# Patient Record
Sex: FEMALE | Race: WHITE | HISPANIC OR LATINO
[De-identification: names, ages, dates, MRNs, and addresses within clinical notes are randomized per-mention and may not be internally consistent; named-entity substitution may affect disease eponyms.]

---

## 2018-08-28 ENCOUNTER — RESULT REVIEW (OUTPATIENT)
Age: 43
End: 2018-08-28

## 2019-01-05 ENCOUNTER — EMERGENCY (EMERGENCY)
Facility: HOSPITAL | Age: 44
LOS: 1 days | Discharge: HOME | End: 2019-01-05
Attending: STUDENT IN AN ORGANIZED HEALTH CARE EDUCATION/TRAINING PROGRAM
Payer: MEDICAID

## 2019-01-05 VITALS
DIASTOLIC BLOOD PRESSURE: 89 MMHG | HEART RATE: 112 BPM | OXYGEN SATURATION: 97 % | SYSTOLIC BLOOD PRESSURE: 115 MMHG | RESPIRATION RATE: 17 BRPM | TEMPERATURE: 98 F

## 2019-01-05 LAB
ALBUMIN SERPL ELPH-MCNC: 4.2 G/DL — SIGNIFICANT CHANGE UP (ref 3.5–5)
ALP SERPL-CCNC: 97 U/L — SIGNIFICANT CHANGE UP (ref 40–120)
ALT FLD-CCNC: 48 U/L DA — SIGNIFICANT CHANGE UP (ref 10–60)
ANION GAP SERPL CALC-SCNC: 11 MMOL/L — SIGNIFICANT CHANGE UP (ref 5–17)
AST SERPL-CCNC: 22 U/L — SIGNIFICANT CHANGE UP (ref 10–40)
BILIRUB SERPL-MCNC: 0.7 MG/DL — SIGNIFICANT CHANGE UP (ref 0.2–1.2)
BUN SERPL-MCNC: 18 MG/DL — SIGNIFICANT CHANGE UP (ref 7–18)
CALCIUM SERPL-MCNC: 9.9 MG/DL — SIGNIFICANT CHANGE UP (ref 8.4–10.5)
CHLORIDE SERPL-SCNC: 104 MMOL/L — SIGNIFICANT CHANGE UP (ref 96–108)
CO2 SERPL-SCNC: 24 MMOL/L — SIGNIFICANT CHANGE UP (ref 22–31)
CREAT SERPL-MCNC: 0.85 MG/DL — SIGNIFICANT CHANGE UP (ref 0.5–1.3)
D DIMER BLD IA.RAPID-MCNC: 196 NG/ML DDU — SIGNIFICANT CHANGE UP
GLUCOSE SERPL-MCNC: 102 MG/DL — HIGH (ref 70–99)
HCG SERPL-ACNC: <1 MIU/ML — SIGNIFICANT CHANGE UP
HCT VFR BLD CALC: 43.7 % — SIGNIFICANT CHANGE UP (ref 34.5–45)
HGB BLD-MCNC: 14.2 G/DL — SIGNIFICANT CHANGE UP (ref 11.5–15.5)
LYMPHOCYTES # BLD AUTO: 16 % — SIGNIFICANT CHANGE UP (ref 13–44)
MCHC RBC-ENTMCNC: 29.3 PG — SIGNIFICANT CHANGE UP (ref 27–34)
MCHC RBC-ENTMCNC: 32.4 GM/DL — SIGNIFICANT CHANGE UP (ref 32–36)
MCV RBC AUTO: 90.6 FL — SIGNIFICANT CHANGE UP (ref 80–100)
MONOCYTES NFR BLD AUTO: 6 % — SIGNIFICANT CHANGE UP (ref 2–14)
NEUTROPHILS NFR BLD AUTO: 78 % — HIGH (ref 43–77)
PLATELET # BLD AUTO: 419 K/UL — HIGH (ref 150–400)
POTASSIUM SERPL-MCNC: 4.1 MMOL/L — SIGNIFICANT CHANGE UP (ref 3.5–5.3)
POTASSIUM SERPL-SCNC: 4.1 MMOL/L — SIGNIFICANT CHANGE UP (ref 3.5–5.3)
PROT SERPL-MCNC: 9.9 G/DL — HIGH (ref 6–8.3)
RBC # BLD: 4.82 M/UL — SIGNIFICANT CHANGE UP (ref 3.8–5.2)
RBC # FLD: 11.6 % — SIGNIFICANT CHANGE UP (ref 10.3–14.5)
SODIUM SERPL-SCNC: 139 MMOL/L — SIGNIFICANT CHANGE UP (ref 135–145)
TROPONIN I SERPL-MCNC: <0.015 NG/ML — SIGNIFICANT CHANGE UP (ref 0–0.04)
WBC # BLD: 18.7 K/UL — HIGH (ref 3.8–10.5)
WBC # FLD AUTO: 18.7 K/UL — HIGH (ref 3.8–10.5)

## 2019-01-05 PROCEDURE — 71046 X-RAY EXAM CHEST 2 VIEWS: CPT | Mod: 26

## 2019-01-05 PROCEDURE — 99285 EMERGENCY DEPT VISIT HI MDM: CPT

## 2019-01-05 RX ORDER — MORPHINE SULFATE 50 MG/1
4 CAPSULE, EXTENDED RELEASE ORAL ONCE
Qty: 0 | Refills: 0 | Status: DISCONTINUED | OUTPATIENT
Start: 2019-01-05 | End: 2019-01-05

## 2019-01-05 RX ORDER — KETOROLAC TROMETHAMINE 30 MG/ML
30 SYRINGE (ML) INJECTION ONCE
Qty: 0 | Refills: 0 | Status: DISCONTINUED | OUTPATIENT
Start: 2019-01-05 | End: 2019-01-05

## 2019-01-05 RX ORDER — SODIUM CHLORIDE 9 MG/ML
1000 INJECTION INTRAMUSCULAR; INTRAVENOUS; SUBCUTANEOUS ONCE
Qty: 0 | Refills: 0 | Status: COMPLETED | OUTPATIENT
Start: 2019-01-05 | End: 2019-01-05

## 2019-01-05 RX ORDER — FAMOTIDINE 10 MG/ML
20 INJECTION INTRAVENOUS ONCE
Qty: 0 | Refills: 0 | Status: COMPLETED | OUTPATIENT
Start: 2019-01-05 | End: 2019-01-05

## 2019-01-05 RX ADMIN — Medication 30 MILLIGRAM(S): at 23:58

## 2019-01-05 RX ADMIN — Medication 30 MILLILITER(S): at 20:36

## 2019-01-05 RX ADMIN — SODIUM CHLORIDE 1000 MILLILITER(S): 9 INJECTION INTRAMUSCULAR; INTRAVENOUS; SUBCUTANEOUS at 22:07

## 2019-01-05 RX ADMIN — Medication 30 MILLIGRAM(S): at 22:07

## 2019-01-05 RX ADMIN — SODIUM CHLORIDE 1000 MILLILITER(S): 9 INJECTION INTRAMUSCULAR; INTRAVENOUS; SUBCUTANEOUS at 20:33

## 2019-01-05 RX ADMIN — FAMOTIDINE 20 MILLIGRAM(S): 10 INJECTION INTRAVENOUS at 20:37

## 2019-01-05 NOTE — ED PROVIDER NOTE - PHYSICAL EXAMINATION
no midline c, t or l spine ttp.  mild discomfort to palpation of thoracic paraspinal in the region of the scapula

## 2019-01-05 NOTE — ED PROVIDER NOTE - OBJECTIVE STATEMENT
42 y/o female with no significant PMHx presents to the ED c/o back pain x 2 weeks. Pt notes she has also been experiencing acid reflux Sx x 3 days. Pt denies fever, chills, chest pain, shortness of breath, nausea, vomiting, focal weakness/numbness, or any other complaints. Pt also denies recent trauma, fall or IV drug use. NKDA. 44 y/o female with no significant PMHx presents to the ED c/o back pain x 1 weeks. Pt notes she has also been experiencing acid reflux Sx x 3 days. Pt denies fever, chills, chest pain, shortness of breath, nausea, vomiting, focal weakness/numbness, or any other complaints. Pt also denies recent trauma, fall or IV drug use. NKDA.

## 2019-01-05 NOTE — ED PROVIDER NOTE - CHPI ED SYMPTOMS NEG
no fever/no chills, no chest pain, no shortness of breath, no nausea, no vomiting, no focal weakness/numbness

## 2019-01-05 NOTE — ED ADULT NURSE NOTE - NSIMPLEMENTINTERV_GEN_ALL_ED
Implemented All Universal Safety Interventions:  [place] to call system. Call bell, personal items and telephone within reach. Instruct patient to call for assistance. Room bathroom lighting operational. Non-slip footwear when patient is off stretcher. Physically safe environment: no spills, clutter or unnecessary equipment. Stretcher in lowest position, wheels locked, appropriate side rails in place.

## 2019-01-05 NOTE — ED PROVIDER NOTE - MEDICAL DECISION MAKING DETAILS
patient presenting with back pain. otherwise well appearing. neurovascularly intact. lung clear. concern for msk pain vs pna vs ptx vs acs. will obtain ekg, lab, trop, cxr. no midline back pain, neurovascularly intact. no signs of caude, no ivdu, immunocompromise. will treat pain and reassess

## 2019-01-05 NOTE — ED PROVIDER NOTE - PROGRESS NOTE DETAILS
patient ct shows concern for esophageal dysmotility. patient denies abd pain. endorses having bms. patient tolerate small amount of po fluid. hr improved. wbc and ketone likely in the setting of mild dehydration. patient offered admission for work up, patient endorses she rather follow up outpatient gi. patient endorses she want pain med, med prescribed. patient instructed to eat smaller meal, giving time for transit. instructed to return for new or worsening symptoms including pain, inability to tolerate po. patient info placed in f.u book to facilitate gi f.u and upper gi series

## 2019-01-05 NOTE — ED ADULT TRIAGE NOTE - CHIEF COMPLAINT QUOTE
C/o cold x 2 months and pain to right shoulder blade and bad acid reflux for the past 4 days. Unable to eat x 1 week.

## 2019-01-06 VITALS
SYSTOLIC BLOOD PRESSURE: 122 MMHG | RESPIRATION RATE: 16 BRPM | DIASTOLIC BLOOD PRESSURE: 83 MMHG | OXYGEN SATURATION: 97 % | TEMPERATURE: 98 F | HEART RATE: 88 BPM

## 2019-01-06 LAB
APPEARANCE UR: CLEAR — SIGNIFICANT CHANGE UP
BILIRUB UR-MCNC: ABNORMAL
COLOR SPEC: YELLOW — SIGNIFICANT CHANGE UP
DIFF PNL FLD: ABNORMAL
GLUCOSE UR QL: NEGATIVE — SIGNIFICANT CHANGE UP
KETONES UR-MCNC: ABNORMAL
LEUKOCYTE ESTERASE UR-ACNC: ABNORMAL
NITRITE UR-MCNC: NEGATIVE — SIGNIFICANT CHANGE UP
PH UR: 5 — SIGNIFICANT CHANGE UP (ref 5–8)
PROT UR-MCNC: 30 MG/DL
SP GR SPEC: 1.02 — SIGNIFICANT CHANGE UP (ref 1.01–1.02)
UROBILINOGEN FLD QL: 4

## 2019-01-06 PROCEDURE — 81001 URINALYSIS AUTO W/SCOPE: CPT

## 2019-01-06 PROCEDURE — 96361 HYDRATE IV INFUSION ADD-ON: CPT | Mod: XU

## 2019-01-06 PROCEDURE — 71275 CT ANGIOGRAPHY CHEST: CPT | Mod: 26

## 2019-01-06 PROCEDURE — 84702 CHORIONIC GONADOTROPIN TEST: CPT

## 2019-01-06 PROCEDURE — 87086 URINE CULTURE/COLONY COUNT: CPT

## 2019-01-06 PROCEDURE — 96374 THER/PROPH/DIAG INJ IV PUSH: CPT | Mod: XU

## 2019-01-06 PROCEDURE — 85027 COMPLETE CBC AUTOMATED: CPT

## 2019-01-06 PROCEDURE — 85379 FIBRIN DEGRADATION QUANT: CPT

## 2019-01-06 PROCEDURE — 84484 ASSAY OF TROPONIN QUANT: CPT

## 2019-01-06 PROCEDURE — 36415 COLL VENOUS BLD VENIPUNCTURE: CPT

## 2019-01-06 PROCEDURE — 96375 TX/PRO/DX INJ NEW DRUG ADDON: CPT | Mod: XU

## 2019-01-06 PROCEDURE — 80053 COMPREHEN METABOLIC PANEL: CPT

## 2019-01-06 PROCEDURE — 71046 X-RAY EXAM CHEST 2 VIEWS: CPT

## 2019-01-06 PROCEDURE — 93005 ELECTROCARDIOGRAM TRACING: CPT

## 2019-01-06 PROCEDURE — 71275 CT ANGIOGRAPHY CHEST: CPT

## 2019-01-06 PROCEDURE — 99284 EMERGENCY DEPT VISIT MOD MDM: CPT | Mod: 25

## 2019-01-06 RX ORDER — LIDOCAINE 4 G/100G
30 CREAM TOPICAL ONCE
Qty: 0 | Refills: 0 | Status: COMPLETED | OUTPATIENT
Start: 2019-01-06 | End: 2019-01-06

## 2019-01-06 RX ORDER — FAMOTIDINE 10 MG/ML
20 INJECTION INTRAVENOUS ONCE
Qty: 0 | Refills: 0 | Status: COMPLETED | OUTPATIENT
Start: 2019-01-06 | End: 2019-01-06

## 2019-01-06 RX ADMIN — FAMOTIDINE 20 MILLIGRAM(S): 10 INJECTION INTRAVENOUS at 00:28

## 2019-01-06 RX ADMIN — MORPHINE SULFATE 4 MILLIGRAM(S): 50 CAPSULE, EXTENDED RELEASE ORAL at 00:28

## 2019-01-06 RX ADMIN — LIDOCAINE 15 MILLILITER(S): 4 CREAM TOPICAL at 00:28

## 2019-01-07 LAB
CULTURE RESULTS: SIGNIFICANT CHANGE UP
SPECIMEN SOURCE: SIGNIFICANT CHANGE UP

## 2019-05-31 ENCOUNTER — EMERGENCY (EMERGENCY)
Facility: HOSPITAL | Age: 44
LOS: 1 days | Discharge: HOME | End: 2019-05-31
Attending: EMERGENCY MEDICINE
Payer: MEDICAID

## 2019-05-31 VITALS
RESPIRATION RATE: 16 BRPM | DIASTOLIC BLOOD PRESSURE: 83 MMHG | OXYGEN SATURATION: 99 % | WEIGHT: 139.99 LBS | TEMPERATURE: 98 F | HEART RATE: 95 BPM | HEIGHT: 64 IN | SYSTOLIC BLOOD PRESSURE: 125 MMHG

## 2019-05-31 PROCEDURE — 99283 EMERGENCY DEPT VISIT LOW MDM: CPT | Mod: 25

## 2019-06-01 LAB
ALBUMIN SERPL ELPH-MCNC: 3.5 G/DL — SIGNIFICANT CHANGE UP (ref 3.5–5)
ALP SERPL-CCNC: 89 U/L — SIGNIFICANT CHANGE UP (ref 40–120)
ALT FLD-CCNC: 14 U/L DA — SIGNIFICANT CHANGE UP (ref 10–60)
ANION GAP SERPL CALC-SCNC: 8 MMOL/L — SIGNIFICANT CHANGE UP (ref 5–17)
AST SERPL-CCNC: 11 U/L — SIGNIFICANT CHANGE UP (ref 10–40)
BASOPHILS # BLD AUTO: 0.05 K/UL — SIGNIFICANT CHANGE UP (ref 0–0.2)
BASOPHILS NFR BLD AUTO: 0.4 % — SIGNIFICANT CHANGE UP (ref 0–2)
BILIRUB SERPL-MCNC: 0.7 MG/DL — SIGNIFICANT CHANGE UP (ref 0.2–1.2)
BUN SERPL-MCNC: 4 MG/DL — LOW (ref 7–18)
CALCIUM SERPL-MCNC: 8.5 MG/DL — SIGNIFICANT CHANGE UP (ref 8.4–10.5)
CHLORIDE SERPL-SCNC: 106 MMOL/L — SIGNIFICANT CHANGE UP (ref 96–108)
CO2 SERPL-SCNC: 26 MMOL/L — SIGNIFICANT CHANGE UP (ref 22–31)
CREAT SERPL-MCNC: 0.7 MG/DL — SIGNIFICANT CHANGE UP (ref 0.5–1.3)
EOSINOPHIL # BLD AUTO: 0.16 K/UL — SIGNIFICANT CHANGE UP (ref 0–0.5)
EOSINOPHIL NFR BLD AUTO: 1.2 % — SIGNIFICANT CHANGE UP (ref 0–6)
GLUCOSE SERPL-MCNC: 126 MG/DL — HIGH (ref 70–99)
HCT VFR BLD CALC: 31.9 % — LOW (ref 34.5–45)
HGB BLD-MCNC: 10.5 G/DL — LOW (ref 11.5–15.5)
IMM GRANULOCYTES NFR BLD AUTO: 0.4 % — SIGNIFICANT CHANGE UP (ref 0–1.5)
LYMPHOCYTES # BLD AUTO: 26.4 % — SIGNIFICANT CHANGE UP (ref 13–44)
LYMPHOCYTES # BLD AUTO: 3.6 K/UL — HIGH (ref 1–3.3)
MAGNESIUM SERPL-MCNC: 1.8 MG/DL — SIGNIFICANT CHANGE UP (ref 1.6–2.6)
MCHC RBC-ENTMCNC: 30.1 PG — SIGNIFICANT CHANGE UP (ref 27–34)
MCHC RBC-ENTMCNC: 32.9 GM/DL — SIGNIFICANT CHANGE UP (ref 32–36)
MCV RBC AUTO: 91.4 FL — SIGNIFICANT CHANGE UP (ref 80–100)
MONOCYTES # BLD AUTO: 0.91 K/UL — HIGH (ref 0–0.9)
MONOCYTES NFR BLD AUTO: 6.7 % — SIGNIFICANT CHANGE UP (ref 2–14)
NEUTROPHILS # BLD AUTO: 8.86 K/UL — HIGH (ref 1.8–7.4)
NEUTROPHILS NFR BLD AUTO: 64.9 % — SIGNIFICANT CHANGE UP (ref 43–77)
NRBC # BLD: 0 /100 WBCS — SIGNIFICANT CHANGE UP (ref 0–0)
PLATELET # BLD AUTO: 375 K/UL — SIGNIFICANT CHANGE UP (ref 150–400)
POTASSIUM SERPL-MCNC: 3.1 MMOL/L — LOW (ref 3.5–5.3)
POTASSIUM SERPL-SCNC: 3.1 MMOL/L — LOW (ref 3.5–5.3)
PROT SERPL-MCNC: 7.7 G/DL — SIGNIFICANT CHANGE UP (ref 6–8.3)
RBC # BLD: 3.49 M/UL — LOW (ref 3.8–5.2)
RBC # FLD: 15.6 % — HIGH (ref 10.3–14.5)
SODIUM SERPL-SCNC: 140 MMOL/L — SIGNIFICANT CHANGE UP (ref 135–145)
WBC # BLD: 13.63 K/UL — HIGH (ref 3.8–10.5)
WBC # FLD AUTO: 13.63 K/UL — HIGH (ref 3.8–10.5)

## 2019-06-01 PROCEDURE — 93005 ELECTROCARDIOGRAM TRACING: CPT

## 2019-06-01 PROCEDURE — 80053 COMPREHEN METABOLIC PANEL: CPT

## 2019-06-01 PROCEDURE — 85027 COMPLETE CBC AUTOMATED: CPT

## 2019-06-01 PROCEDURE — 83735 ASSAY OF MAGNESIUM: CPT

## 2019-06-01 PROCEDURE — 99283 EMERGENCY DEPT VISIT LOW MDM: CPT

## 2019-06-01 PROCEDURE — 36415 COLL VENOUS BLD VENIPUNCTURE: CPT

## 2019-06-01 RX ORDER — POTASSIUM CHLORIDE 20 MEQ
40 PACKET (EA) ORAL ONCE
Refills: 0 | Status: COMPLETED | OUTPATIENT
Start: 2019-06-01 | End: 2019-06-01

## 2019-06-01 RX ADMIN — Medication 40 MILLIEQUIVALENT(S): at 01:55

## 2019-06-01 NOTE — ED PROVIDER NOTE - CLINICAL SUMMARY MEDICAL DECISION MAKING FREE TEXT BOX
44 y/o F presents to the ED with hypokalemia of unknown cause. Will check basic labs, EKG, replace potassium as needed. Advise follow up with PCP for possible nephro follow up.

## 2019-06-01 NOTE — ED PROVIDER NOTE - PROGRESS NOTE DETAILS
Patient potassium at 3.1. Will replace with PO and discharge with PCP and nephro follow up. Encouraged potassium rich food products.

## 2019-06-01 NOTE — ED PROVIDER NOTE - CHPI ED SYMPTOMS NEG
no urinary symptoms, no diarrhea, no change in dietary patterns, no leg swelling, no palpitations/no vomiting/no fever/no chills

## 2019-06-01 NOTE — ED PROVIDER NOTE - OBJECTIVE STATEMENT
42 y/o F with no significant PMHx presents to the ED with complaints of low potassium x2 after having blood drawn; last one at 2.8. Denies urinary symptoms, GI symptoms including vomiting or diarrhea, fever, chills, change in dietary patterns, leg swelling, palpitations or any other acute complaints.

## 2021-09-07 ENCOUNTER — RESULT REVIEW (OUTPATIENT)
Age: 46
End: 2021-09-07

## 2022-09-19 PROBLEM — Z00.00 ENCOUNTER FOR PREVENTIVE HEALTH EXAMINATION: Status: ACTIVE | Noted: 2022-09-19

## 2022-11-11 ENCOUNTER — APPOINTMENT (OUTPATIENT)
Dept: ORTHOPEDIC SURGERY | Facility: CLINIC | Age: 47
End: 2022-11-11

## 2022-11-11 ENCOUNTER — NON-APPOINTMENT (OUTPATIENT)
Age: 47
End: 2022-11-11

## 2022-11-11 VITALS
HEIGHT: 64 IN | BODY MASS INDEX: 25.27 KG/M2 | SYSTOLIC BLOOD PRESSURE: 102 MMHG | WEIGHT: 148 LBS | DIASTOLIC BLOOD PRESSURE: 67 MMHG

## 2022-11-11 PROCEDURE — 20550 NJX 1 TENDON SHEATH/LIGAMENT: CPT | Mod: F8

## 2022-11-11 PROCEDURE — 99204 OFFICE O/P NEW MOD 45 MIN: CPT | Mod: 25

## 2022-11-11 PROCEDURE — 73130 X-RAY EXAM OF HAND: CPT | Mod: RT

## 2022-12-07 ENCOUNTER — APPOINTMENT (OUTPATIENT)
Dept: RHEUMATOLOGY | Facility: CLINIC | Age: 47
End: 2022-12-07

## 2022-12-07 VITALS
BODY MASS INDEX: 25.61 KG/M2 | WEIGHT: 150 LBS | HEART RATE: 64 BPM | TEMPERATURE: 97 F | SYSTOLIC BLOOD PRESSURE: 105 MMHG | RESPIRATION RATE: 14 BRPM | OXYGEN SATURATION: 99 % | DIASTOLIC BLOOD PRESSURE: 64 MMHG | HEIGHT: 64 IN

## 2022-12-07 DIAGNOSIS — M13.0 POLYARTHRITIS, UNSPECIFIED: ICD-10-CM

## 2022-12-07 PROCEDURE — 99204 OFFICE O/P NEW MOD 45 MIN: CPT | Mod: GC

## 2022-12-12 NOTE — PHYSICAL EXAM
[General Appearance - Alert] : alert [General Appearance - In No Acute Distress] : in no acute distress [General Appearance - Well-Appearing] : healthy appearing [Sclera] : the sclera and conjunctiva were normal [Outer Ear] : the ears and nose were normal in appearance [Examination Of The Oral Cavity] : the lips and gums were normal [Oropharynx] : the oropharynx was normal [Neck Appearance] : the appearance of the neck was normal [Respiration, Rhythm And Depth] : normal respiratory rhythm and effort [Exaggerated Use Of Accessory Muscles For Inspiration] : no accessory muscle use [Auscultation Breath Sounds / Voice Sounds] : lungs were clear to auscultation bilaterally [Heart Rate And Rhythm] : heart rate was normal and rhythm regular [Heart Sounds] : normal S1 and S2 [Edema] : there was no peripheral edema [Abdomen Soft] : soft [Musculoskeletal - Swelling] : no joint swelling seen [] : no rash [Oriented To Time, Place, And Person] : oriented to person, place, and time [Impaired Insight] : insight and judgment were intact [Memory Recent] : recent memory was not impaired [FreeTextEntry1] : no swelling/warmth/erythema of the joints of the UE/LE. flexion deformity of right 4th finger noted along with surgical scar. diffuse tenderness noted on all parts of the UE and LE

## 2022-12-12 NOTE — ASSESSMENT
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 Lyme western blot only with on positive IgG not meeting criteria for Lyme disease\par -patient endorses only 3-4 hours of sleep per night due to pain with pain waking her up\par -recent life stressor of father passing away one year ago\par -endorses history of snoring reported to her by her daughter several years ago\par -endorses pain with exposure to droplets of water\par -will trial gabapentin 100mg qHs to up titrate to 300mg qHs as tolerated\par -patient consoled on drowsiness associated with gabapentin and to not drive/use heavy machinery\par -will [facility] to [facility] in [place]\par -patient to get neurology referral to evaluate for underlying neuropathy.  Pt states that she will obtain referral  from PCP at [facility] Dr. Pauly Phan\par \par OV in 2 months\par \par case d/w Dr. Ortiz\par \par sAa Trotter MD, PGY-4\par Rheumatology Fellow\par BELÉN\par

## 2022-12-12 NOTE — CONSULT LETTER
[Dear  ___] : Dear  [unfilled], [Consult Letter:] : I had the pleasure of evaluating your patient, [unfilled]. [Please see my note below.] : Please see my note below. [Consult Closing:] : Thank you very much for allowing me to participate in the care of this patient.  If you have any questions, please do not hesitate to contact me. [Sincerely,] : Sincerely, [FreeTextEntry3] : Dr. Sarah Ortiz \par Rheumatology Attending\par [facility]\par

## 2022-12-14 ENCOUNTER — APPOINTMENT (OUTPATIENT)
Dept: ORTHOPEDIC SURGERY | Facility: CLINIC | Age: 47
End: 2022-12-14

## 2022-12-14 PROCEDURE — 99213 OFFICE O/P EST LOW 20 MIN: CPT

## 2023-02-08 ENCOUNTER — OUTPATIENT (OUTPATIENT)
Dept: OUTPATIENT SERVICES | Facility: HOSPITAL | Age: 48
LOS: 1 days | End: 2023-02-08
Payer: MEDICAID

## 2023-02-08 ENCOUNTER — RESULT REVIEW (OUTPATIENT)
Age: 48
End: 2023-02-08

## 2023-02-08 ENCOUNTER — APPOINTMENT (OUTPATIENT)
Dept: RHEUMATOLOGY | Facility: CLINIC | Age: 48
End: 2023-02-08
Payer: MEDICAID

## 2023-02-08 ENCOUNTER — APPOINTMENT (OUTPATIENT)
Dept: RADIOLOGY | Facility: CLINIC | Age: 48
End: 2023-02-08
Payer: MEDICAID

## 2023-02-08 ENCOUNTER — APPOINTMENT (OUTPATIENT)
Dept: RADIOLOGY | Facility: CLINIC | Age: 48
End: 2023-02-08

## 2023-02-08 VITALS
OXYGEN SATURATION: 98 % | WEIGHT: 137 LBS | HEART RATE: 93 BPM | SYSTOLIC BLOOD PRESSURE: 108 MMHG | DIASTOLIC BLOOD PRESSURE: 75 MMHG | RESPIRATION RATE: 16 BRPM | BODY MASS INDEX: 23.39 KG/M2 | HEIGHT: 64 IN

## 2023-02-08 DIAGNOSIS — M54.50 LOW BACK PAIN, UNSPECIFIED: ICD-10-CM

## 2023-02-08 DIAGNOSIS — M54.9 DORSALGIA, UNSPECIFIED: ICD-10-CM

## 2023-02-08 PROCEDURE — 72100 X-RAY EXAM L-S SPINE 2/3 VWS: CPT | Mod: 26

## 2023-02-08 PROCEDURE — 72070 X-RAY EXAM THORAC SPINE 2VWS: CPT | Mod: 26

## 2023-02-08 PROCEDURE — 72050 X-RAY EXAM NECK SPINE 4/5VWS: CPT

## 2023-02-08 PROCEDURE — 72070 X-RAY EXAM THORAC SPINE 2VWS: CPT

## 2023-02-08 PROCEDURE — 72050 X-RAY EXAM NECK SPINE 4/5VWS: CPT | Mod: 26

## 2023-02-08 PROCEDURE — 72100 X-RAY EXAM L-S SPINE 2/3 VWS: CPT

## 2023-02-08 PROCEDURE — 99213 OFFICE O/P EST LOW 20 MIN: CPT

## 2023-02-16 ENCOUNTER — NON-APPOINTMENT (OUTPATIENT)
Age: 48
End: 2023-02-16

## 2023-02-16 NOTE — HISTORY OF PRESENT ILLNESS
[FreeTextEntry1] : 47 y.o F with no PMHx presents to Rheumatology clinic to establish new care\par \par Patient went to an outside Rheumatologist  Dr. Brasher for pain and received a work up. The patient then elected to change Rheumatologists.\par \par Patient endorses generalized pain from the neck down that started 3-4 years ago. Pain would last for hours. Describes a flare up of pain in February causing her to be bedbound. Pain used to be sporadic, now is constant. Describes the pain as a constant ache. Patient endorses the worst pain in the right shoulder blade and back. Improved with massages and describes that various masseuse note tightness of the area. Pain is improved with Tylenol and salon pas/heat patches (gets 25-50% relief). Has minor flares ups every few days, with severe flare ups a few times a year. Flare ups last several hours. Patient states pain is worst at the end of day. Endorses occasional AM stiffness for 20-30 minutes\par \par Denies brain fog. States she does not feel well rested when she wakes up in the morning. Sleeps only 3-4 hours a night due to the back pain. Denies physical trauma. Patient's father passed away last year. \par \par Endorses fingers turning purple on cold exposure. Has 1 child. Born via . Has had 3 abortions. Denies VTE history. \par \par Negative ALINE/AIXA/Sjogrens/dsDNA/CCP/RF/C3/C4/CK/aldolase/B2GP1. Lyme western blot only with on positive IgG not meeting criteria for Lyme disease\par \par FMHx: negative autoimmune disease history\par Last travelling as to [place] a year ago\par Denies outdoor exposure\par Denies smoking history. Social EtOH. Denies other drug use\par \par Interval history\par ____________ \par not taking gabapentin regularly as not feeling comfortable with side effects \par takes Tylenol as needed\par pain is progressively getting worse\par pain in the spine \par goes for massages \par does chinese medicine procedures \par

## 2023-02-16 NOTE — PHYSICAL EXAM
[General Appearance - Alert] : alert [General Appearance - In No Acute Distress] : in no acute distress [Full Pulse] : the pedal pulses are present [Edema] : there was no peripheral edema [FreeTextEntry1] : diffuse msk point tenderness;  paraspinal, subcutaneous mass in mid-thoracic area [Oriented To Time, Place, And Person] : oriented to person, place, and time [Impaired Insight] : insight and judgment were intact [Affect] : the affect was normal

## 2023-02-16 NOTE — ASSESSMENT
[FreeTextEntry1] : obtain x-rays as outlined below \par patient declines medication at this time \par may need further imaging based on x-ray findings\par \par My collective time spent on today's visit was greater than 25 minutes and included: Preparation for the visit, review of the medical records, review of pertinent diagnostic studies, examination and counseling of the patient on the above diagnosis, treatment plan and prognosis, orders of diagnostic test, medications and or appropriate procedures and documentation in the medical record of today's visit.\par

## 2023-02-22 ENCOUNTER — APPOINTMENT (OUTPATIENT)
Dept: ORTHOPEDIC SURGERY | Facility: CLINIC | Age: 48
End: 2023-02-22
Payer: MEDICAID

## 2023-02-22 PROCEDURE — 99213 OFFICE O/P EST LOW 20 MIN: CPT | Mod: 25

## 2023-02-27 DIAGNOSIS — M54.9 DORSALGIA, UNSPECIFIED: ICD-10-CM

## 2023-04-06 ENCOUNTER — APPOINTMENT (OUTPATIENT)
Dept: ORTHOPEDIC SURGERY | Facility: CLINIC | Age: 48
End: 2023-04-06
Payer: MEDICAID

## 2023-04-06 PROCEDURE — 99205 OFFICE O/P NEW HI 60 MIN: CPT | Mod: 25

## 2023-04-06 PROCEDURE — 20552 NJX 1/MLT TRIGGER POINT 1/2: CPT

## 2023-04-06 NOTE — HISTORY OF PRESENT ILLNESS
[10] : 10 [Dull/Aching] : dull/aching [Intermittent] : intermittent [Nothing helps with pain getting better] : Nothing helps with pain getting better [de-identified] : 3/31/23 Standup Cervical MRI  - report noted in chart. \par Straightening of lordosis with multilevel loss of disc signal.\par No compression fracture.\par \par C1-C2: Arthrosis with no effusion.\par \par C2-C3: No herniation, foraminal stenosis or central stenosis.\par \par C3-C4: Broad bulge with no herniation, foraminal stenosis or central stenosis.\par Facet hypertrophy.\par \par C4-C5: Broad bulge with central herniation and no central stenosis. Facet\par hypertrophy with inferior left foraminal stenosis.\par \par C5-C6: Broad bulge with central and asymmetric to left herniation impressing\par on the thecal sac with no contact of the cord or central stenosis. Luschka\par hypertrophy and facet arthrosis with left foraminal stenosis.\par \par C6-C7: Broad bulge with no herniation or central stenosis. Luschka hypertrophy\par and facet hypertrophy with no foraminal stenosis.\par \par C7-T1: No herniation, foraminal stenosis or central stenosis. Luschka\par hypertrophy and facet hypertrophy with left foraminal stenosis\par Ind. 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 No fracture.\par -------------------------------------------------\par \par 04/06/2023 - 47 year old  female presents for R shoulder pain X 5-6 years, worsening in the last few weeks. Pain radiates across the R scapula to the arm. Rheumatologist tried gabapentin, not significantly better. Tried patches, APAP, mild relief. No bb dysfunction. \par PMH: fibromyalgia [] : no [de-identified] : certain movement  [de-identified] : 2/16/23 [de-identified] : rheumatologist  [de-identified] : MRI

## 2023-04-06 NOTE — ASSESSMENT
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  Risks outlined include but are not limited to infection, bleeding, scarring, skin discoloration, temporary increase in pain, syncopal episode, failure to resolve symptoms, allergic reaction, flare reaction, permanent white skin discoloration, symptom recurrence, and elevation of blood sugar in diabetics.  Patient understood the risks.  All questions were answered.  After discussion of options, patient requested an injection.  Oral informed consent was obtained and sterile prep was done of the injection site.  Sterile technique was used to introduce the mixture. The mixture consisted of: \par 4 cc 1% lidocaine\par 80 mg of depomedrol\par Patient tolerated the procedure well.  Patient advised to ice the injection site this evening.  Signs and symptoms of infection reviewed and patient advised to call immediately for redness, fevers, and/or chills. \par R rhomboid

## 2023-04-06 NOTE — IMAGING
[de-identified] : CSPINE\par Inspection: No rash or ecchymosis\par Palpation: No spasm in traps, rhomboids, paracervicals. TTP R trap and rhomboid, severe\par ROM: limited all planes\par Strength: 5/5 LEFT deltoid, biceps, triceps, wrist flexors, wrist extensors, , abductors. R delt, bi 4/5 limited by pain otherwise 5/5\par Sensation: Sensation present to light touch bilateral C5-T1 distributions\par Reflexes: Negative Narayan's bilaterally

## 2023-04-13 ENCOUNTER — APPOINTMENT (OUTPATIENT)
Dept: RHEUMATOLOGY | Facility: CLINIC | Age: 48
End: 2023-04-13

## 2023-04-13 ENCOUNTER — NON-APPOINTMENT (OUTPATIENT)
Age: 48
End: 2023-04-13

## 2023-04-26 ENCOUNTER — APPOINTMENT (OUTPATIENT)
Dept: ORTHOPEDIC SURGERY | Facility: CLINIC | Age: 48
End: 2023-04-26
Payer: MEDICAID

## 2023-04-26 PROCEDURE — 99213 OFFICE O/P EST LOW 20 MIN: CPT | Mod: 25

## 2023-05-18 ENCOUNTER — APPOINTMENT (OUTPATIENT)
Dept: ORTHOPEDIC SURGERY | Facility: CLINIC | Age: 48
End: 2023-05-18
Payer: MEDICAID

## 2023-05-18 DIAGNOSIS — M89.8X1 OTHER SPECIFIED DISORDERS OF BONE, SHOULDER: ICD-10-CM

## 2023-05-18 PROCEDURE — 99214 OFFICE O/P EST MOD 30 MIN: CPT

## 2023-05-18 PROCEDURE — 73010 X-RAY EXAM OF SHOULDER BLADE: CPT | Mod: RT

## 2023-05-18 NOTE — HISTORY OF PRESENT ILLNESS
[10] : 10 [Dull/Aching] : dull/aching [Intermittent] : intermittent [Nothing helps with pain getting better] : Nothing helps with pain getting better [de-identified] : 3/31/23 Standup Cervical MRI  - report noted in chart. \par Straightening of lordosis with multilevel loss of disc signal.\par No compression fracture.\par \par C1-C2: Arthrosis with no effusion.\par \par C2-C3: No herniation, foraminal stenosis or central stenosis.\par \par C3-C4: Broad bulge with no herniation, foraminal stenosis or central stenosis.\par Facet hypertrophy.\par \par C4-C5: Broad bulge with central herniation and no central stenosis. Facet\par hypertrophy with inferior left foraminal stenosis.\par \par C5-C6: Broad bulge with central and asymmetric to left herniation impressing\par on the thecal sac with no contact of the cord or central stenosis. Luschka\par hypertrophy and facet arthrosis with left foraminal stenosis.\par \par C6-C7: Broad bulge with no herniation or central stenosis. Luschka hypertrophy\par and facet hypertrophy with no foraminal stenosis.\par \par C7-T1: No herniation, foraminal stenosis or central stenosis. Luschka\par hypertrophy and facet hypertrophy with left foraminal stenosis\par Ind. 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 No fracture.\par -------------------------------------------------\par \par 04/06/2023 - 47 year old  female presents for R shoulder pain X 5-6 years, worsening in the last few weeks. Pain radiates across the R scapula to the arm. Rheumatologist tried gabapentin, not significantly better. Tried patches, APAP, mild relief. No bb dysfunction. \par PMH: fibromyalgia\par 5/18/23- sxs same. R rhomboid TPI gave some temporary relief for several days [] : no [FreeTextEntry5] : ALEJANDRO 47 year old F here for RT Shoulder , reports no improvement with PT since last visit  [de-identified] : certain movement  [de-identified] : 2/16/23 [de-identified] : rheumatologist  [de-identified] : MRI

## 2023-05-18 NOTE — ASSESSMENT
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 Pain present for over one year, awakens her at night. \par MRI R scapula to eval for underlying abnormality or lesion. \par \par

## 2023-06-28 ENCOUNTER — APPOINTMENT (OUTPATIENT)
Dept: ORTHOPEDIC SURGERY | Facility: CLINIC | Age: 48
End: 2023-06-28
Payer: MEDICAID

## 2023-06-28 DIAGNOSIS — M65.341 TRIGGER FINGER, RIGHT RING FINGER: ICD-10-CM

## 2023-06-28 DIAGNOSIS — M24.541 CONTRACTURE, RIGHT HAND: ICD-10-CM

## 2023-06-28 PROCEDURE — 20550 NJX 1 TENDON SHEATH/LIGAMENT: CPT | Mod: F8

## 2023-06-28 PROCEDURE — 99214 OFFICE O/P EST MOD 30 MIN: CPT | Mod: 25

## 2023-07-05 NOTE — IMAGING
[de-identified] : CSPINE\par Inspection: No rash or ecchymosis\par Palpation: No spasm in traps, rhomboids, paracervicals. TTP R trap and rhomboid, severe\par ROM: limited all planes\par Strength: 5/5 LEFT deltoid, biceps, triceps, wrist flexors, wrist extensors, , abductors. R delt, bi 4/5 limited by pain otherwise 5/5\par Sensation: Sensation present to light touch bilateral C5-T1 distributions\par Reflexes: Negative Narayan's bilaterally \par \par R shoulder- no clear scapular winging isolated decreased muscle bulk  [Right] : right shoulder [There are no fractures, subluxations or dislocations. No significant abnormalities are seen] : There are no fractures, subluxations or dislocations. No significant abnormalities are seen 4 = No assist / stand by assistance

## 2023-07-27 ENCOUNTER — APPOINTMENT (OUTPATIENT)
Dept: ORTHOPEDIC SURGERY | Facility: CLINIC | Age: 48
End: 2023-07-27
Payer: MEDICAID

## 2023-07-27 DIAGNOSIS — M54.2 CERVICALGIA: ICD-10-CM

## 2023-07-27 DIAGNOSIS — M62.838 OTHER MUSCLE SPASM: ICD-10-CM

## 2023-07-27 PROCEDURE — 99215 OFFICE O/P EST HI 40 MIN: CPT

## 2023-07-27 NOTE — IMAGING
[de-identified] : CSPINE\par Inspection: No rash or ecchymosis\par Palpation: No spasm in traps, rhomboids, paracervicals. TTP R trap and rhomboid, severe\par ROM: limited all planes\par Strength: 5/5 LEFT deltoid, biceps, triceps, wrist flexors, wrist extensors, , abductors. R delt, bi 4/5 limited by pain otherwise 5/5\par Sensation: Sensation present to light touch bilateral C5-T1 distributions\par Reflexes: Negative Narayan's bilaterally \par \par R shoulder- no clear scapular winging or isolated decreased muscle bulk  [Right] : right shoulder [There are no fractures, subluxations or dislocations. No significant abnormalities are seen] : There are no fractures, subluxations or dislocations. No significant abnormalities are seen

## 2023-07-27 NOTE — HISTORY OF PRESENT ILLNESS
[de-identified] : 3/31/23 Standup Cervical MRI  - report noted in chart. \par Straightening of lordosis with multilevel loss of disc signal.\par No compression fracture.\par \par C1-C2: Arthrosis with no effusion.\par \par C2-C3: No herniation, foraminal stenosis or central stenosis.\par \par C3-C4: Broad bulge with no herniation, foraminal stenosis or central stenosis.\par Facet hypertrophy.\par \par C4-C5: Broad bulge with central herniation and no central stenosis. Facet\par hypertrophy with inferior left foraminal stenosis.\par \par C5-C6: Broad bulge with central and asymmetric to left herniation impressing\par on the thecal sac with no contact of the cord or central stenosis. Luschka\par hypertrophy and facet arthrosis with left foraminal stenosis.\par \par C6-C7: Broad bulge with no herniation or central stenosis. Luschka hypertrophy\par and facet hypertrophy with no foraminal stenosis.\par \par C7-T1: No herniation, foraminal stenosis or central stenosis. Luschka\par hypertrophy and facet hypertrophy with left foraminal stenosis\par Ind. 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 No fracture.\par \par R scapula Standup MRI 6/29/23- report noted in chart. \par Ind. review- agree\par \par EMG 6/1/23-\par Right chronic C7 radic\par -------------------------------------------------\par \par 04/06/2023 - 47 year old  female presents for R shoulder pain X 5-6 years, worsening in the last few weeks. Pain radiates across the R scapula to the arm. Rheumatologist tried gabapentin, not significantly better. Tried patches, APAP, mild relief. No bb dysfunction. \par PMH: fibromyalgia\par 5/18/23- sxs same. R rhomboid TPI gave some temporary relief for several days\par 7/27/23- MRI f/u  [10] : 10 [Dull/Aching] : dull/aching [Intermittent] : intermittent [Nothing helps with pain getting better] : Nothing helps with pain getting better [] : yes [de-identified] : certain movement  [de-identified] : 2/16/23 [de-identified] : rheumatologist  [de-identified] : MRI

## 2023-07-27 NOTE — ASSESSMENT
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

## 2023-08-01 ENCOUNTER — APPOINTMENT (OUTPATIENT)
Dept: PAIN MANAGEMENT | Facility: CLINIC | Age: 48
End: 2023-08-01
Payer: MEDICAID

## 2023-08-01 VITALS — WEIGHT: 122 LBS | HEIGHT: 64 IN | BODY MASS INDEX: 20.83 KG/M2

## 2023-08-01 DIAGNOSIS — Z78.9 OTHER SPECIFIED HEALTH STATUS: ICD-10-CM

## 2023-08-01 DIAGNOSIS — Z87.39 PERSONAL HISTORY OF OTHER DISEASES OF THE MUSCULOSKELETAL SYSTEM AND CONNECTIVE TISSUE: ICD-10-CM

## 2023-08-01 PROCEDURE — 99204 OFFICE O/P NEW MOD 45 MIN: CPT

## 2023-08-01 NOTE — HISTORY OF PRESENT ILLNESS
[Right Arm] : right arm [10] : 10 [Dull/Aching] : dull/aching [Radiating] : radiating [Shooting] : shooting [Stabbing] : stabbing [Throbbing] : throbbing [Constant] : constant [Household chores] : household chores [Leisure] : leisure [Social interactions] : social interactions [Meds] : meds [] : yes [FreeTextEntry9] : Tylenol  [de-identified] : activity  [de-identified] : 4/6/23 [de-identified] : MRI  [FreeTextEntry1] : Initial HPI 8/1/23:  Ms. GTZ is a 47 year year old F who presents for initial evaluation for neck and arm pain. Pain started 6 weeks ago.  Has tried a home exercise program by a physician.  It is not associated with any inciting injury.  Pain radiates to down the R arm. Pain is described as shooting and electric shock when radiating.  Pain is worsened with sitting, coughing and bearing down.  Patient has failed conservative treatment with acetaminophen, NSAIDs, muscle relaxants, and physical therapy/HEP. Pain is causing significant functional limitation resulting in diminished quality of life and impaired age appropriate ADL's. Patient denies loss of bowel/bladder function, new motor deficits, fevers, or chills. There is also associated numbness/tingling.  Images Reviewed:  MRI C-spine 4/5/23 MRI T-spine 4/5/23 MRI R-scapula 6/29/23

## 2023-08-01 NOTE — ASSESSMENT
[FreeTextEntry1] : 47F w/ cervical radiculopathy. A component of her pain may be from cervical facets.

## 2023-08-01 NOTE — PHYSICAL EXAM
[de-identified] : Gen: NAD Psych: mood appropriate for given condition Skin: intact Sensation: decreased to light touch over R/L arm, intact to lt touch bilaterally in c4-t1  Reflexes: 2+ b/l BR, Bicep, BR and patella Narayan negative ROM: Cervical ROM full, shoulder, elbow and wrist ROM full,  Special tests: Kerns's positive bilaterally, Hawkin's negative bilaterally, neg Empty can, neg Neer's. Spurling's pos on the R Palpation: tender cervical paraspinals, levator scapula and trapezius non tender bicipital tendon Motor: 				Right	Left	 C4	Shoulder Abduction	 5	 5	 C5	Elbow Flexion  		 5	 5	 C6	Wrist Extension		 5	 5	 C7	Elbow Extension 	 5	 5	 C8/T1	Hand Intrinsics 		 5	 5	 C8	First Dorsal Interosseus	 5	 5	 C8	Abductor Pollicus Brevis	 5	 5	 C5/6	Shoulder ER		 5	 5

## 2023-08-01 NOTE — DISCUSSION/SUMMARY
[de-identified] :  After discussing various treatment options with the patient including but not limited to oral medications, physical therapy, exercise, modalities as well as interventional spinal injections, we have decided with the following plan:   - pharmacological: c/w otc anti-inflammatories prn - Imaging: I personally reviewed the radiological images and agree with the radiologist's report. The radiological findings were discussed with the patient. - Interventional: schedule for C7/T1 EMPERATRIZ. Procedure explained using an anatomical model. Risks and benefits explained to patient who verbalized understanding, including increased risk of infection, bleeding, nerve injury.  - rehabilitative: c/w HEP/PT  - follow up 2-3 weeks post-procedure  Nixon Law MD  ILESI/CAUDAL  Indications for an interlaminar epidural for this specific patient include the following   - Pt has been in pain for at least 6 weeks and has failed conservative care (e.g. Exercise, physical methods, NSAID/ and or muscle relaxants) and has been compliant with these conservative measures  - Pt has no major risk factors for spinal cancer or contraindicated condition  - radicular pain is interfering with functional activity - pain is associated with symptoms of nerve root irritation  - any numbness documented is accompanied with paresthesia  - no evidence of systemic or local infection, bleeding tendency or unstable medical condition  - Interlaminar epidural injections are provided as part of a comprehensive pain management program, which includes physical therapy, patient education, psychosocial support, and oral medications, where appropriate. - Pt has significant functional limitation resulting in diminished quality of life and impaired age appropriate ADL's.  - diagnostic evaluation has ruled out other causes of pain - pt participating in an active rehabilitation program or home exercise program which has been discussed with the patient including heat ice and rest - no more than 3 epidurals will be done in a 6 month period at the same level with at least 14 days between injections - All repeat injections have at least 50% pain relief and increase functional gain and physical activity, and reduction in reliance on the use of medication and or physical therapy - MAC is only offered in cases of severe needle phobia  -  Injection done at C7/T1 level(s) which is consistent with patient's dermatomal pain complaint  	- patient does not have major risk factors for spinal cancer, e.g. LBP with fever) or, if cancer is present, but the pain is clearly unrelated 	- there is no co-existing medical or other condition that precludes the safe performance of the procedure, e.g. New onset of LBP with fever, risk factors for, or signs of, cauda equina syndrome, rapidly progressing (or other) neurological deficits 	- numbness and/or weakness documented is associated with paresthesia/dysesthesia or pain - Pain associated with - Herpes Zoster and/or - Suspected radicular pain, based on radiation of pain along the dermatome (sensory distribution) of a nerve and/or - Neurogenic claudication and/or -Low back pain, NPRS = 3/10 (moderate to severe pain) associated with significant impairment of activities of daily living (ADLs) and one of the following:         			- substantial imaging abnormalityies such as a central disc herniation,     			- severe degenerative disc disease or central spinal stenosis.     			- Failure of four weeks (counting from onset of pain) of non-surgical, non-injection care, which includes appropriate oral medication(s) and physical therapy to the extent tolerated.         				- Exceptions to the 4 week wait may include:            				- pain from Herpes Zoster           				- at least moderate pain with significant functional loss at work or home.          				- severe pain unresponsive to outpatient medical management. - inability to tolerate non-surgical, non-injection care due to co-existing medical condition(s) - prior successful injections for same specific condition with relief of at least 3 months duration.  - Steroid dosing will  be the lowest effective amount, it is recommended not to exceed 80 mg of triamcinolone, 12 mg of betamethasone, 15 mg of dexamethasone per session.

## 2023-08-08 ENCOUNTER — APPOINTMENT (OUTPATIENT)
Dept: PAIN MANAGEMENT | Facility: CLINIC | Age: 48
End: 2023-08-08
Payer: MEDICAID

## 2023-08-08 DIAGNOSIS — M54.12 RADICULOPATHY, CERVICAL REGION: ICD-10-CM

## 2023-08-08 PROCEDURE — 62321 NJX INTERLAMINAR CRV/THRC: CPT

## 2023-08-08 NOTE — PROCEDURE
[FreeTextEntry3] : Date of Service: 08/08/2023   Account: [id number]   Patient: ALEJANDRO GTZ   YOB: 1975   Age: 47 year   Surgeon:                                                      Nixon Law M.D.  Pre-Operative Diagnosis:                         Cervical Radiculopathy (M54.12)   Post Operative Diagnosis:                       Cervical Radiculopathy (M54.12)  Procedure:                                                  Interlaminar cervical epidural steroid injection (C7-T1) under fluoroscopic guidance  Anesthesia:                                                 MAC   This procedure was carried out using fluoroscopic guidance.  The risks and benefits of the procedure were discussed extensively with the patient.  The consent of the patient was obtained and the following procedure was performed.  The patient was placed in the prone position using a thoracic and chin support.  The cervical area was prepped and draped in a sterile fashion.  The fluoroscope visualized the C7-T1 interspace using slight cephalad-caudad angulation and this area was marked.  Using sterile technique the superficial skin was anesthetized with 1% Lidocaine without epinephrine.  A 18 gauge Tuohy needle was advanced under fluoroscopy using [id number] technique until ligament was engaged.  The stilette was then removed and a loss of resistance syringe with sterile saline was attached. The needle was then advanced under fluoroscopic guidance until there was loss of resistance.  Lateral view confirmed final needle tip placement in the epidural space.  After negative aspiration for heme and CSF, 1 cc of Omnipaque confirmed good cervical epiduragram.    Cervical epidurogram showed no evidence of intrathecal or intravascular flow, and good bilateral epidural flow from C3 to T2 levels.  An injectate of 6 cc of preservative free normal saline plus 10 mg of dexamethasone was then injected into the epidural space. The needle was subsequently removed and pressure was applied.  Anesthesia personnel were present throughout the procedure.  The patient tolerated the procedure well and was instructed to contact me immediately if there were any problems.  Nixon Law M.D.

## 2023-08-23 ENCOUNTER — APPOINTMENT (OUTPATIENT)
Dept: PAIN MANAGEMENT | Facility: CLINIC | Age: 48
End: 2023-08-23
Payer: MEDICAID

## 2023-08-23 VITALS — HEIGHT: 64 IN | BODY MASS INDEX: 20.83 KG/M2 | WEIGHT: 122 LBS

## 2023-08-23 PROCEDURE — 99214 OFFICE O/P EST MOD 30 MIN: CPT

## 2023-08-23 NOTE — HISTORY OF PRESENT ILLNESS
[Neck] : neck [Right Arm] : right arm [7] : 7 [Radiating] : radiating [Sharp] : sharp [Shooting] : shooting [Tingling] : tingling [Constant] : constant [Household chores] : household chores [Leisure] : leisure [Social interactions] : social interactions [Nothing helps with pain getting better] : Nothing helps with pain getting better [FreeTextEntry1] : Interval HPI 08/23/2023:  Pt returns after C7-T1 EMPERATRIZ on 08/08/23 with 70% relief of cervical radicular pain. She now has primarily axial neck pain. Pain is not associated with any inciting injury. Pain does not radiate to the upper extremities; it at times, refers to the base of the head. Pain is exacerbated by extension and rotation of the neck. Pain occurs intermittently. It is described as dull and aching. Patient has failed conservative treatment with NSAIDs, muscle relaxants, tylenol, and physical therapy. Pain is causing significant functional limitation resulting in diminished quality of life and impaired age appropriate ADL's. Denies fever, chills, numbness/tingling, bowel/bladder dysfunction, motor weakness. Pain started 4 months ago and has progressively worsened.	  Initial HPI 8/1/23:  Ms. GTZ is a 47 year year old F who presents for initial evaluation for neck and arm pain. Pain started 6 weeks ago.  Has tried a home exercise program by a physician.  It is not associated with any inciting injury.  Pain radiates to down the R arm. Pain is described as shooting and electric shock when radiating.  Pain is worsened with sitting, coughing and bearing down.  Patient has failed conservative treatment with acetaminophen, NSAIDs, muscle relaxants, and physical therapy/HEP. Pain is causing significant functional limitation resulting in diminished quality of life and impaired age appropriate ADL's. Patient denies loss of bowel/bladder function, new motor deficits, fevers, or chills. There is also associated numbness/tingling.  Images Reviewed:  MRI C-spine 4/5/23 MRI T-spine 4/5/23 MRI R-scapula 6/29/23  Pain Tx Hx:  C7-T1 BETSY 8/8/23 - 70% relief of radicular pain [] : no [FreeTextEntry6] : numbness

## 2023-08-23 NOTE — DISCUSSION/SUMMARY
[de-identified] : After discussing various treatment options with the patient including but not limited to oral medications, physical therapy, exercise, modalities as well as interventional spinal injections, we have decided with the following plan:   - pharmacological: c/w otc anti-inflammatories prn - Imaging: I personally reviewed the radiological images and agree with the radiologist's report. The radiological findings were discussed with the patient. - Interventional: schedule for Right C4-6 medial branch block. Procedure explained using an anatomical model. Risks and benefits explained to patient who verbalized understanding, including increased risk of infection, bleeding, nerve injury.  - rehabilitative: c/w HEP/PT  - follow up 2-3 weeks post-procedure  Nixon Law MD  CERVICAL MBB  Indications for cervical medial branch blocks in this patient: - Pt has had symptoms for three months with moderate to severe pain with functional impairment rated of 7/10 pain.  - Pain non responsive to conservative care.   - Pain predominately axial and not associated with radiculopathy or claudication.   - No non-facet pathology as source of pain.   - Clinical assessment implicates facet joint as putative pain source.   - Pain is exacerbated by extension and relieved by rest.   - No unexplained neurologic deficit.   - No history of coagulopathy, infection or unstable medical conditions.   - Pain is causing significant functional limitation resulting in diminished quality of life and impaired age appropriate ADL's.   	- Therapeutics allowed if they last 3 months with >50% reduction in pain, however the limitations below apply.  	- Intra-articular block (IA) only done if a medial branch block can not be performed due to specific documented anatomic restrictions or there is an indication to proceed with therapeutic intraarticular injections.  There restrictions are clearly documented in the medical record and make available upon request.   	- MBB's will only be performed with the following limitations 		- second diagnostic will be 2 weeks after first diagnostic 		***- NO MORE THAN 4 DIAGNOSTIC PROCEDURES DONE IN A ROLLING 12 MONTH PERIOD 		- diagnostic procedures are performed with the intent to proceed with radiofrequency ablation as the primary treatment goal at the diagnosed levels 		- moderate to severe chronic neck pain or low back pain, predominantly axial, that causes functional deficit measured on pain or disability scale 		- absence of untreated radiculopathy or neurogenic claudication (except for radiculopathy caused by facet joint synovial cyst) 		- There is no non-facet pathology per clinical assessment or radiology studies that could explain the source of the patient's pain, including but not limited to fracture, tumor, infection or significant deformity.  	- In order to maintain target specificity, total IA injection volume must not exceed 1.0 mL per cervical joint or 2 mL per lumbar joint, including contrast. Larger volumes may be used only when performing a purposeful facet cyst rupture in the lumbar spine. - Total MBB anesthetic volume shall be limited to a maximum of 0.5 mL per MB nerve for diagnostic purposes and 2ml for therapeutic. For a third occipital nerve block, up to 1.0 mL is allowed for diagnostic and 2ml for therapeutic purposes. - In total, no more than 100 mg of triamcinolone or methylprednisolone or 15 mg of betamethasone or dexamethasone or equivalents shall be injected during any single injection session.  - Non-thermal RF modalities for facet joint denervation including chemical, low grade thermal energy (<80 degrees Celsius), as well as pulsed RF will not be performed or when performed will be coded as 14501      Facet joint interventions done without CT or fluoroscopic guidance are considered not reasonable and necessary. This includes facet joint interventions done without any guidance, performed under ultrasound guidance, or with magnetic resonance imaging (MRI).  Limitations  1.General anesthesia is considered not reasonable and necessary for facet joint interventions. Neither conscious sedation nor monitored anesthesia care (MAC) is routinely necessary for intraarticular facet joint injections or medial branch blocks and are not routinely reimbursable. Individual consideration may be given on redetermination (appeal) for payment in rare, unique circumstances if the medical necessity of sedation is unequivocal and clearly documented in the medical record. Frequent reporting of these services together may trigger focused medical review.  2.It is not expected that patients will routinely present with pain in both cervical/thoracic and lumbar spinal regions. Therefore, facet joint interventions (both diagnostic and therapeutic) are limited to one spinal region per session.  3. It is not routinely necessary for multiple blocks (e.g., epidural injections, sympathetic blocks, trigger point injections, etc.) to be provided to a patient on the same day as facet joint procedures. Multiple blocks on the same day could lead to improper or lack of diagnosis. If performed, the medical necessity of each injection (at the same or a different level[s]) must be clearly documented in the medical record. For example, the performance of both paravertebral facet joint procedures(s) and a transforaminal epidural injection (TFESI) at the same or close spinal level at the same encounter would not be expected unless a synovial cyst is compressing the nerve root. In this situation, TFESI may provide relief for the radicular pain, while the facet cyst rupture allows nerve root decompression. Frequent reporting of multiple blocks on the same day may trigger a focused medical review.  4. Facet joint intraarticular injections and medial branch blocks may involve the use of anesthetic, corticosteroids, anti-inflammatories and/or contrast agents, and does not include injections of biologicals or other substances not FDA designated for this use. 5. One to two levels, either unilateral or bilateral, are allowed per session per spine region. The need for a three or four-level procedure bilaterally may be considered under unique circumstances and with sufficient documentation of medical necessity on appeal. A session is a time period, which includes all procedures (i.e., medial branch block (MBB), intraarticular injections (IA), facet cyst ruptures, and RFA ablations that are performed during the same day. 6. If there is an extended time, two years or more, since the last RFA and/or there is a question as to the source of the recurrent pain then diagnostic procedures must be repeated. 7. Therapeutic intraarticular facet injections are not covered unless there is justification in the medical documentation on why RFA cannot be performed. Facet joint procedures in patients for the indication of generalized pain conditions (such as fibromyalgia) or chronic centralized pain syndromes are considered not reasonable and necessary. Individual consideration may be considered under unique circumstances and with sufficient documentation of medical necessity on appeal. 8. In patients with implanted electrical devices, providers must follow  instructions and extra planning as indicated to ensure safety of procedure.

## 2023-08-23 NOTE — PHYSICAL EXAM
[de-identified] : Gen: NAD Psych: mood appropriate for given condition Sensation: intact to lt touch bilaterally in c4-t1  Reflexes: 2+ BR, Bicep, tricep ROM: Cervical ROM full, shoulder, elbow and wrist ROM full,   Inspection: no atrophy of bicep, FDI or APB noted,  Special tests: Kerns's (cervical extension and rotation) pos right, Hawkin's negative bilaterally, neg Empty can, neg Scarf,   Palpation: tender cervical paraspinals, levator scapula and trapezius non tender bicipital tendon Motor: 			Right	Left	 C4	Shoulder Abd	 5	 5	 C5	Elbow Flexion  	 5	 5	 C6	Wrist Extension	 5	 5	 C7	Elbow Ext 	 5	 5	 C8/T1	Hand Intrinsics 	 5	 5	 C8	FDI		 5	 5	 C8	APB		 5	 5	 C5/6	Shoulder ER	 4	 4

## 2023-08-23 NOTE — ASSESSMENT
[FreeTextEntry1] : 47F w/ hx fibromyalgia, cervical radiculopathy which is now treated. She currently has cervical spondylosis.

## 2023-09-25 ENCOUNTER — APPOINTMENT (OUTPATIENT)
Dept: PAIN MANAGEMENT | Facility: CLINIC | Age: 48
End: 2023-09-25

## 2023-10-09 ENCOUNTER — APPOINTMENT (OUTPATIENT)
Dept: PAIN MANAGEMENT | Facility: CLINIC | Age: 48
End: 2023-10-09
Payer: MEDICAID

## 2023-10-09 DIAGNOSIS — M47.812 SPONDYLOSIS W/OUT MYELOPATHY OR RADICULOPATHY, CERVICAL REGION: ICD-10-CM

## 2023-10-09 PROCEDURE — 64490 INJ PARAVERT F JNT C/T 1 LEV: CPT | Mod: RT

## 2023-10-09 PROCEDURE — 64491 INJ PARAVERT F JNT C/T 2 LEV: CPT | Mod: 59,RT

## 2023-10-09 PROCEDURE — J3490M: CUSTOM

## 2023-10-11 ENCOUNTER — APPOINTMENT (OUTPATIENT)
Dept: PAIN MANAGEMENT | Facility: CLINIC | Age: 48
End: 2023-10-11

## 2023-10-25 ENCOUNTER — APPOINTMENT (OUTPATIENT)
Dept: PAIN MANAGEMENT | Facility: CLINIC | Age: 48
End: 2023-10-25
Payer: MEDICAID

## 2023-10-25 VITALS — BODY MASS INDEX: 20.83 KG/M2 | HEIGHT: 64 IN | WEIGHT: 122 LBS

## 2023-10-25 DIAGNOSIS — M79.18 MYALGIA, OTHER SITE: ICD-10-CM

## 2023-10-25 PROCEDURE — 99213 OFFICE O/P EST LOW 20 MIN: CPT

## 2023-10-25 RX ORDER — TIZANIDINE 2 MG/1
2 TABLET ORAL 3 TIMES DAILY
Qty: 90 | Refills: 0 | Status: ACTIVE | COMMUNITY
Start: 2023-10-25 | End: 1900-01-01

## 2023-11-22 ENCOUNTER — APPOINTMENT (OUTPATIENT)
Dept: PAIN MANAGEMENT | Facility: CLINIC | Age: 48
End: 2023-11-22

## 2023-12-28 ENCOUNTER — APPOINTMENT (OUTPATIENT)
Dept: ORTHOPEDIC SURGERY | Facility: CLINIC | Age: 48
End: 2023-12-28

## 2024-08-22 ENCOUNTER — APPOINTMENT (OUTPATIENT)
Dept: ORTHOPEDIC SURGERY | Facility: CLINIC | Age: 49
End: 2024-08-22
Payer: COMMERCIAL

## 2024-08-22 DIAGNOSIS — M54.12 RADICULOPATHY, CERVICAL REGION: ICD-10-CM

## 2024-08-22 PROCEDURE — 72070 X-RAY EXAM THORAC SPINE 2VWS: CPT

## 2024-08-22 PROCEDURE — 99214 OFFICE O/P EST MOD 30 MIN: CPT

## 2024-08-22 NOTE — IMAGING
[Right] : right shoulder [There are no fractures, subluxations or dislocations. No significant abnormalities are seen] : There are no fractures, subluxations or dislocations. No significant abnormalities are seen [de-identified] : CSPINE Inspection: No rash or ecchymosis Palpation: No spasm in traps, rhomboids, paracervicals. TTP R trap and rhomboid, severe ROM: limited all planes Strength: 5/5 LEFT deltoid, biceps, triceps, wrist flexors, wrist extensors, , abductors. R delt, bi 4/5 limited by pain otherwise 5/5 Sensation: Sensation present to light touch bilateral C5-T1 distributions Reflexes: Negative Narayan's bilaterally

## 2024-08-22 NOTE — ASSESSMENT
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

## 2024-08-22 NOTE — HISTORY OF PRESENT ILLNESS
[10] : 10 [Dull/Aching] : dull/aching [Intermittent] : intermittent [Nothing helps with pain getting better] : Nothing helps with pain getting better [de-identified] : 3/31/23 Standup Cervical MRI  - report noted in chart.  Straightening of lordosis with multilevel loss of disc signal. No compression fracture.  C1-C2: Arthrosis with no effusion.  C2-C3: No herniation, foraminal stenosis or central stenosis.  C3-C4: Broad bulge with no herniation, foraminal stenosis or central stenosis. Facet hypertrophy.  C4-C5: Broad bulge with central herniation and no central stenosis. Facet hypertrophy with inferior left foraminal stenosis.  C5-C6: Broad bulge with central and asymmetric to left herniation impressing on the thecal sac with no contact of the cord or central stenosis. Luschka hypertrophy and facet arthrosis with left foraminal stenosis.  C6-C7: Broad bulge with no herniation or central stenosis. Luschka hypertrophy and facet hypertrophy with no foraminal stenosis.  C7-T1: No herniation, foraminal stenosis or central stenosis. Luschka hypertrophy and facet hypertrophy with left foraminal stenosis Ind. review-  C5/6 HNP with abutment of cord and L>R NF narrowing  T spine MRI 3/31/23 (stand-up): No herniation. No fracture.  R scapula Standup MRI 6/29/23- report noted in chart.  Ind. review- agree  EMG 6/1/23- Right chronic C7 radic  Dr. Law pain [facility].  Faith 8/8/23, and R C4-6 facet blocks 10/9/23- no relief  -------------------------------------------------  04/06/2023 - 47 year old  female presents for R shoulder pain X 5-6 years, worsening in the last few weeks. Pain radiates across the R scapula to the arm. Rheumatologist tried gabapentin, not significantly better. Tried patches, APAP, mild relief. No bb dysfunction.  PMH: fibromyalgia 5/18/23- sxs same. R rhomboid TPI gave some temporary relief for several days 7/27/23- MRI f/u  08/22/2024: here for follow up, she continues to have pain in several joints. patient with fibromyalgia. She was unable to tolerate recommended treatment including injection therapy, PT and medication management. Patient continues to have B periscapular pain and RUE radicular symptoms.  [] : no [de-identified] : certain movement  [de-identified] : 2/16/23 [de-identified] : rheumatologist  [de-identified] : MRI